# Patient Record
(demographics unavailable — no encounter records)

---

## 2017-04-26 NOTE — EMERGENCY ROOM REPORT
History of Present Illness


General


Chief Complaint:  Laceration


Source:  Patient





Present Illness


HPI


27-year-old female presents emergency department complaining of accidentally 

puncturing her left palm with a knife when attempting to remove the seed from 

an advocado.  Patient iced taking blood thinning medications and states that 

bleeding has subsided at this time.  Patient states she does not know when her 

last tetanus vaccination was and she believes it was more than 5 years.  She 

denies pain. Denies numbness tingling or loss of sensation or gross motor 

movements of the extremities, incontinence of bowel or bladder. Denies CP, 

Palpitations, LOC, AMS, dizziness, Changes in Vision, Sensation, paresthesias, 

or a sudden severe headache.


Allergies:  


Coded Allergies:  


     Prabhu (Verified  Allergy, Unknown, 17)


     PENICILLINS (Verified  Allergy, Unknown, 17)





Patient History


Past Medical History:  see triage record


Past Surgical History:  none


Pertinent Family History:  none


Last Menstrual Period:  A WEEK AGO


Pregnant Now:  No


:  0


Reviewed Nursing Documentation:  PMH: Agreed, PSxH: Agreed





Nursing Documentation-PMH


Past Medical History:  No Stated History





Review of Systems


All Other Systems:  negative except mentioned in HPI





Physical Exam





Vital Signs








  Date Time  Temp Pulse Resp B/P Pulse Ox O2 Delivery O2 Flow Rate FiO2


 


17 19:48 98.2 72 20 118/78 94 Room Air  








Sp02 EP Interpretation:  reviewed, normal


General Appearance:  no apparent distress, alert, GCS 15, non-toxic


Head:  normocephalic, atraumatic


ENT:  hearing grossly normal, normal pharynx, no angioedema, normal voice


Neck:  full range of motion, supple/symm/no masses


Respiratory:  chest non-tender, lungs clear, normal breath sounds, speaking 

full sentences


Cardiovascular #1:  regular rate, rhythm, no edema


Musculoskeletal:  back normal, gait/station normal, normal range of motion, non-

tender


Neurologic:  alert, oriented x3, responsive, motor strength/tone normal, 

sensory intact, speech normal


Psychiatric:  judgement/insight normal, memory normal, mood/affect normal


Skin:  normal color, no rash, warm/dry, well hydrated, laceration - small 0.3cm 

laceration to the palm of the left hand superficial.





Procedures


Laceration/Wound Repair


Laceration/Wound Repair :  


   Consent:  Emergent


   Wound Location:  upper extremity - left hand


   Wound's Depth, Shape:  superficial


   Wound Length (cm):  0


   Wound Explored:  clean


   Irrigated w/ Saline (ccs):  30


   Wound Repaired With:  Dermabond


   Layer Closure?:  No


   Sterile Dressing Applied?:  Yes


   Splint Applied?:  No


   Type of Splint Applied:  ace wrap applied


   Sling Applied?:  No


   Patient Tolerated:  Well


   Complications:  None





Medical Decision Making


PA Attestation


Dr. Patino is my supervising Physician whom patient management has been 

discussed with.


Diagnostic Impression:  


 Primary Impression:  


 Laceration


ER Course


Pt. presents to the ED c/o laceration to Left Palm x 1 day.


Tetanus status is unknown, pt believes it was more than 5 years ago.





Ddx considered but are not limited to laceration, tendon injury, cellulitis, 

amputation





Vital signs: are WNL, pt. is afebrile


H&PE are most consistent with:  superficial palm  laceration approx  0.3 cm in 

length.





ORDERS: none required at this time, the diagnosis is clinical





ED INTERVENTIONS: 


- Tetanus vaccine was administered as pt. vaccination status was  unknown.


- The wound was copiously irrigated with normal saline, and explored for 

foreign body for which no FB  was found.  


- The wound was approximated and closed using Dermabond


- Sterile dressing is applied.


- Ace wrap applied to the left hand by ED tech. Pt. remains neurovascularly 

intact.





Discussed with patient:  That we make every effort to approximate the 

laceration as best as we can so that scarring will be as cosmetically pleasing 

as possible with our limited cosmetic skill set in the Emergency dept.  

Regardless of our best efforts there will be scarring after laceration repair.  

The extent of scarring is unknown at this time.  





DISCHARGE: At this time pt. is stable for d/c to home. Will provide printed 

patient care instructions, and any necessary prescriptions. Care plan and 

follow up instructions have been discussed with the patient prior to discharge.





Last Vital Signs








  Date Time  Temp Pulse Resp B/P Pulse Ox O2 Delivery O2 Flow Rate FiO2


 


17 19:48 98.2 72 20 118/78 94 Room Air  








Disposition:  HOME, SELF-CARE


Condition:  Stable


Scripts


Cephalexin* (KEFLEX*) 500 Mg Capsule


500 MG ORAL EVERY 12 HOURS for 7 Days, #14 CAP 0 Refills


   Prov: Yasmin Keita         17


Patient Instructions:  Nonsutured Laceration Care





Additional Instructions:  


Take medications as directed. 


Follow up with PCP in 3-5 days 


Return sooner to ED if new symptoms occur, or current symptoms become worse. 








- Please note that this Emergency Department Report was dictated using [employer] software, occasionally this can lead to 

erroneous entry secondary to interpretation by the dictation equipment.











Yasmin Keita 2017 20:10